# Patient Record
Sex: MALE | Race: ASIAN | Employment: OTHER | URBAN - METROPOLITAN AREA
[De-identification: names, ages, dates, MRNs, and addresses within clinical notes are randomized per-mention and may not be internally consistent; named-entity substitution may affect disease eponyms.]

---

## 2024-11-03 ENCOUNTER — HOSPITAL ENCOUNTER (EMERGENCY)
Age: 69
Discharge: HOME | End: 2024-11-03
Attending: EMERGENCY MEDICINE

## 2024-11-03 ENCOUNTER — APPOINTMENT (OUTPATIENT)
Dept: GENERAL RADIOLOGY | Age: 69
End: 2024-11-03
Attending: EMERGENCY MEDICINE

## 2024-11-03 VITALS
SYSTOLIC BLOOD PRESSURE: 138 MMHG | BODY MASS INDEX: 26.36 KG/M2 | OXYGEN SATURATION: 100 % | TEMPERATURE: 98 F | WEIGHT: 164 LBS | RESPIRATION RATE: 18 BRPM | DIASTOLIC BLOOD PRESSURE: 88 MMHG | HEART RATE: 72 BPM | HEIGHT: 66 IN

## 2024-11-03 DIAGNOSIS — R07.89 CHEST PAIN, ATYPICAL: Primary | ICD-10-CM

## 2024-11-03 LAB
ALBUMIN SERPL-MCNC: 3.8 G/DL (ref 3.4–5)
ALBUMIN/GLOB SERPL: 1.1 {RATIO} (ref 1–2)
ALP LIVER SERPL-CCNC: 65 U/L
ALT SERPL-CCNC: 22 U/L
ANION GAP SERPL CALC-SCNC: 4 MMOL/L (ref 0–18)
APTT PPP: 32.3 SECONDS (ref 23–36)
AST SERPL-CCNC: 13 U/L (ref 15–37)
BASOPHILS # BLD AUTO: 0.05 X10(3) UL (ref 0–0.2)
BASOPHILS NFR BLD AUTO: 0.9 %
BILIRUB SERPL-MCNC: 0.7 MG/DL (ref 0.1–2)
BUN BLD-MCNC: 12 MG/DL (ref 9–23)
CALCIUM BLD-MCNC: 9.3 MG/DL (ref 8.5–10.1)
CHLORIDE SERPL-SCNC: 102 MMOL/L (ref 98–112)
CO2 SERPL-SCNC: 26 MMOL/L (ref 21–32)
CREAT BLD-MCNC: 0.92 MG/DL
D DIMER PPP FEU-MCNC: <0.27 UG/ML FEU (ref ?–0.69)
EGFRCR SERPLBLD CKD-EPI 2021: 90 ML/MIN/1.73M2 (ref 60–?)
EOSINOPHIL # BLD AUTO: 0.06 X10(3) UL (ref 0–0.7)
EOSINOPHIL NFR BLD AUTO: 1.1 %
ERYTHROCYTE [DISTWIDTH] IN BLOOD BY AUTOMATED COUNT: 12.8 %
GLOBULIN PLAS-MCNC: 3.5 G/DL (ref 2.8–4.4)
GLUCOSE BLD-MCNC: 110 MG/DL (ref 70–99)
HCT VFR BLD AUTO: 36.5 %
HGB BLD-MCNC: 12.4 G/DL
IMM GRANULOCYTES # BLD AUTO: 0.01 X10(3) UL (ref 0–1)
IMM GRANULOCYTES NFR BLD: 0.2 %
INR BLD: 1.05 (ref 0.8–1.2)
LYMPHOCYTES # BLD AUTO: 1.54 X10(3) UL (ref 1–4)
LYMPHOCYTES NFR BLD AUTO: 28.4 %
MCH RBC QN AUTO: 29.4 PG (ref 26–34)
MCHC RBC AUTO-ENTMCNC: 34 G/DL (ref 31–37)
MCV RBC AUTO: 86.5 FL
MONOCYTES # BLD AUTO: 0.48 X10(3) UL (ref 0.1–1)
MONOCYTES NFR BLD AUTO: 8.8 %
NEUTROPHILS # BLD AUTO: 3.29 X10 (3) UL (ref 1.5–7.7)
NEUTROPHILS # BLD AUTO: 3.29 X10(3) UL (ref 1.5–7.7)
NEUTROPHILS NFR BLD AUTO: 60.6 %
OSMOLALITY SERPL CALC.SUM OF ELEC: 274 MOSM/KG (ref 275–295)
PLATELET # BLD AUTO: 179 10(3)UL (ref 150–450)
POTASSIUM SERPL-SCNC: 3.9 MMOL/L (ref 3.5–5.1)
PROT SERPL-MCNC: 7.3 G/DL (ref 6.4–8.2)
PROTHROMBIN TIME: 13.5 SECONDS (ref 11.6–14.8)
RBC # BLD AUTO: 4.22 X10(6)UL
SODIUM SERPL-SCNC: 132 MMOL/L (ref 136–145)
TROPONIN I SERPL HS-MCNC: 5 NG/L
TROPONIN I SERPL HS-MCNC: 7 NG/L
WBC # BLD AUTO: 5.4 X10(3) UL (ref 4–11)

## 2024-11-03 PROCEDURE — 71045 X-RAY EXAM CHEST 1 VIEW: CPT | Performed by: EMERGENCY MEDICINE

## 2024-11-03 PROCEDURE — 85730 THROMBOPLASTIN TIME PARTIAL: CPT | Performed by: EMERGENCY MEDICINE

## 2024-11-03 PROCEDURE — 85379 FIBRIN DEGRADATION QUANT: CPT | Performed by: EMERGENCY MEDICINE

## 2024-11-03 PROCEDURE — 36415 COLL VENOUS BLD VENIPUNCTURE: CPT

## 2024-11-03 PROCEDURE — 80053 COMPREHEN METABOLIC PANEL: CPT | Performed by: EMERGENCY MEDICINE

## 2024-11-03 PROCEDURE — 85610 PROTHROMBIN TIME: CPT | Performed by: EMERGENCY MEDICINE

## 2024-11-03 PROCEDURE — 99285 EMERGENCY DEPT VISIT HI MDM: CPT

## 2024-11-03 PROCEDURE — 93010 ELECTROCARDIOGRAM REPORT: CPT

## 2024-11-03 PROCEDURE — 84484 ASSAY OF TROPONIN QUANT: CPT | Performed by: EMERGENCY MEDICINE

## 2024-11-03 PROCEDURE — 99284 EMERGENCY DEPT VISIT MOD MDM: CPT

## 2024-11-03 PROCEDURE — 93005 ELECTROCARDIOGRAM TRACING: CPT

## 2024-11-03 PROCEDURE — 85025 COMPLETE CBC W/AUTO DIFF WBC: CPT | Performed by: EMERGENCY MEDICINE

## 2024-11-03 RX ORDER — ATORVASTATIN CALCIUM 20 MG/1
20 TABLET, FILM COATED ORAL NIGHTLY
COMMUNITY

## 2024-11-03 RX ORDER — LEVOTHYROXINE SODIUM 75 UG/1
70 TABLET ORAL
COMMUNITY

## 2024-11-03 RX ORDER — ASPIRIN 81 MG/1
324 TABLET, CHEWABLE ORAL ONCE
Status: COMPLETED | OUTPATIENT
Start: 2024-11-03 | End: 2024-11-03

## 2024-11-03 RX ORDER — GLIMEPIRIDE 1 MG/1
1 TABLET ORAL
COMMUNITY

## 2024-11-03 RX ORDER — AMLODIPINE BESYLATE 5 MG/1
5 TABLET ORAL DAILY
COMMUNITY

## 2024-11-04 NOTE — ED PROVIDER NOTES
Patient Seen in: ward Emergency Department In [place]      History     Chief Complaint   Patient presents with    Chest Pain     Stated Complaint: chest pain with left arm pain and elevated BP    Subjective:   HPI      Patient is a 68-year-old male presents emergency room with a history of chest discomfort and elevated blood pressures at home.  The patient's chest discomfort started yesterday during the evening.  The patient is been under stress recently as his wife just recently had a heart attack and was just recently admitted to the hospital.  The patient went to immediate care and EKG done which showed evidence of a right bundle branch block with sent to the ER for further evaluation.  The patient states he has had stress test in the past last time being several years ago.  The patient denies history of any other symptoms including shortness of breath, nausea, or diaphoresis.  The patient is history of vomiting or diarrhea.  The patient denies history of any other somatic complaints or discomfort at this time.    Objective:     Past Medical History:    Diabetes (HCC)    Hypertension    Mitral valve prolapse              Past Surgical History:   Procedure Laterality Date    Cataract      bilateral                Social History     Socioeconomic History    Marital status:    Tobacco Use    Smoking status: Never     Passive exposure: Never    Smokeless tobacco: Never   Vaping Use    Vaping status: Never Used   Substance and Sexual Activity    Alcohol use: Not Currently    Drug use: Never                  Physical Exam     ED Triage Vitals [11/03/24 1846]   /88   Pulse 75   Resp 18   Temp 97.8 °F (36.6 °C)   Temp src Temporal   SpO2 99 %   O2 Device None (Room air)       Current Vitals:   Vital Signs  BP: 143/88  Pulse: 75  Resp: 18  Temp: 97.8 °F (36.6 °C)  Temp src: Temporal    Oxygen Therapy  SpO2: 99 %  O2 Device: None (Room air)        Physical Exam  GENERAL: Well-developed, well-nourished  male sitting up breathing easily in no apparent distress.  Patient is nontoxic in appearance  HEENT: Head is normocephalic, atraumatic. Pupils are 4 mm equally round and reactive to light. Oropharynx is clear. Mucous membranes are moist.  LUNGS: Clear to auscultation bilaterally with no wheeze. There is good equal air entry bilaterally.  HEART: Regular rate and rhythm. Normal S1, S2 no S3, or S4. No murmur.  ABDOMEN: There is no focal tenderness to palpation appreciated anywhere throughout the abdomen. There is no guarding, no rebound, no mass, and no organomegaly appreciated. There is normoactive bowel sounds.   EXTREMITIES: There is no cyanosis, clubbing, or edema appreciated. Pulses are 2+ and equal in all 4 extremities.  NEURO: Patient is awake, alert and oriented to time place and person. Motor strength is 5 over 5 in all 4 extremities. There are no gross motor or sensory deficits appreciated. Cranial nerves II through XII are intact.  Patient answering all questions appropriately          ED Course     Labs Reviewed   CBC WITH DIFFERENTIAL WITH PLATELET - Abnormal; Notable for the following components:       Result Value    HGB 12.4 (*)     HCT 36.5 (*)     All other components within normal limits   COMP METABOLIC PANEL (14) - Abnormal; Notable for the following components:    Glucose 110 (*)     Sodium 132 (*)     Calculated Osmolality 274 (*)     AST 13 (*)     All other components within normal limits   TROPONIN I HIGH SENSITIVITY - Normal   PROTHROMBIN TIME (PT) - Normal   PTT, ACTIVATED - Normal   D-DIMER - Normal   SCAN SLIDE   TROPONIN I HIGH SENSITIVITY     EKG    Rate, intervals and axes as noted on EKG Report.  Rate: 71  Rhythm: Sinus Rhythm  Reading: No acute ST elevation.  There is evidence of right bundle zenaida block.         I personally reviewed the patient's chest x-ray images and my individual interpretation shows no any acute pneumothorax or widened mediastinum.  I also reviewed official  radiology report which showed results as noted below.       XR CHEST AP PORTABLE  (CPT=71045)    Result Date: 11/3/2024  CONCLUSION:  No acute cardiopulmonary abnormality is identified.   LOCATION:  [facility]      Dictated by (CST): Rinku Dobson MD on 11/03/2024 at 7:58 PM     Finalized by (CST): Rinku Dobson MD on 11/03/2024 at 8:01 PM             MDM          20:32 patient sitting back and breathing easily in no apparent distress this time.  Patient no complaints of any chest pain at this time.  I discussed the patient's results with the patient and patient's daughter at the bedside and told the patient that I wanted to admit him to the hospital for further workup as even though his blood work, x-ray, and EKG were unremarkable at this time he still could have  some blockage around his heart which could be causing his symptoms.  The patient is refusing admission to the hospital he is aware of the risks and limitations of being discharged to home at this time.  The patient wants to go home at this time and wants to follow-up with cardiology.  Patient has a repeat troponin which is pending at this time.  Patient's case discussed with [facility] cardiology who will be available to follow-up with the patient.      Medical Decision Making  Patient an IV line established blood were drawn including a CBC, chemistries, BUN/creatinine, and blood sugar showed evidence of some mild hyponatremia and no other acute abnormalities noted.  Liver function test were negative.  Troponin found to be 5.  D-dimer found to be negative.  Coags are unremarkable.  Patient was placed on a continuous pulse ox and cardiac monitor.  Patient underwent x-ray and EKG here as noted above.  Patient has restrictive heart disease including hypertension and diabetes and I discussed with the patient and patient's daughter at the bedside that I wanted to admit him to the hospital for workup at this time and the patient is refusing admission to the  hospital and the patient's daughter is also and wants to take the patient home at this time.  They are both aware of the risks and limitations of being discharged home at this time and are aware there still could be a blockage around the heart which could lead to a heart attack.  They are aware of this risk and want to have the patient discharged home with close follow-up.  Patient's case discussed with [facility] cardiology who will follow-up with the patient this week.  Patient's case endorsed to my colleague, Dr. Flores who will follow repeat troponin result and disposition the patient based on ongoing observation results here in the ER.  Patient awaiting final disposition at this time    Disposition and Plan     Clinical Impression:  1. Chest pain, atypical         Disposition:  There is no disposition on file for this visit.  There is no disposition time on file for this visit.    Follow-up:  Matthew Soto MD  [address] [place] [id number]  [phone]    Follow up in 1 day(s)  the office will call you tomorrow to arrange close follow up          Medications Prescribed:  Current Discharge Medication List              Supplementary Documentation:

## 2024-11-04 NOTE — ED INITIAL ASSESSMENT (HPI)
Pt sent from [facility] - Pt with c/o htn with elevated pressures at home ( 160/80- AM ). Pt with mild CP that started last night and early AM. EKG at referring facility shows SR with RBBB.

## 2024-11-05 LAB
ATRIAL RATE: 71 BPM
P AXIS: 23 DEGREES
P-R INTERVAL: 172 MS
Q-T INTERVAL: 410 MS
QRS DURATION: 130 MS
QTC CALCULATION (BEZET): 445 MS
R AXIS: 27 DEGREES
T AXIS: 19 DEGREES
VENTRICULAR RATE: 71 BPM